# Patient Record
Sex: MALE | Employment: FULL TIME | URBAN - METROPOLITAN AREA
[De-identification: names, ages, dates, MRNs, and addresses within clinical notes are randomized per-mention and may not be internally consistent; named-entity substitution may affect disease eponyms.]

---

## 2023-12-06 ENCOUNTER — OFFICE VISIT (OUTPATIENT)
Dept: PRIMARY CARE | Facility: CLINIC | Age: 48
End: 2023-12-06
Payer: COMMERCIAL

## 2023-12-06 ENCOUNTER — LAB (OUTPATIENT)
Dept: LAB | Facility: LAB | Age: 48
End: 2023-12-06
Payer: COMMERCIAL

## 2023-12-06 VITALS
TEMPERATURE: 98.3 F | OXYGEN SATURATION: 97 % | HEIGHT: 70 IN | BODY MASS INDEX: 35.36 KG/M2 | DIASTOLIC BLOOD PRESSURE: 71 MMHG | WEIGHT: 247 LBS | HEART RATE: 61 BPM | SYSTOLIC BLOOD PRESSURE: 114 MMHG

## 2023-12-06 DIAGNOSIS — N50.812 PAIN IN LEFT TESTICLE: ICD-10-CM

## 2023-12-06 DIAGNOSIS — K59.00 CONSTIPATION, UNSPECIFIED CONSTIPATION TYPE: ICD-10-CM

## 2023-12-06 DIAGNOSIS — Z00.00 HEALTHCARE MAINTENANCE: Primary | ICD-10-CM

## 2023-12-06 DIAGNOSIS — D64.9 NORMOCYTIC NORMOCHROMIC ANEMIA: ICD-10-CM

## 2023-12-06 DIAGNOSIS — Z00.00 HEALTHCARE MAINTENANCE: ICD-10-CM

## 2023-12-06 LAB
ALBUMIN SERPL BCP-MCNC: 4.3 G/DL (ref 3.4–5)
ALP SERPL-CCNC: 55 U/L (ref 33–120)
ALT SERPL W P-5'-P-CCNC: 13 U/L (ref 10–52)
ANION GAP SERPL CALC-SCNC: 14 MMOL/L (ref 10–20)
APPEARANCE UR: CLEAR
AST SERPL W P-5'-P-CCNC: 17 U/L (ref 9–39)
BILIRUB SERPL-MCNC: 0.6 MG/DL (ref 0–1.2)
BILIRUB UR STRIP.AUTO-MCNC: NEGATIVE MG/DL
BUN SERPL-MCNC: 21 MG/DL (ref 6–23)
CALCIUM SERPL-MCNC: 9.3 MG/DL (ref 8.6–10.6)
CHLORIDE SERPL-SCNC: 107 MMOL/L (ref 98–107)
CHOLEST SERPL-MCNC: 175 MG/DL (ref 0–199)
CHOLESTEROL/HDL RATIO: 2.8
CO2 SERPL-SCNC: 25 MMOL/L (ref 21–32)
COLOR UR: YELLOW
CREAT SERPL-MCNC: 1.1 MG/DL (ref 0.5–1.3)
ERYTHROCYTE [DISTWIDTH] IN BLOOD BY AUTOMATED COUNT: 12.1 % (ref 11.5–14.5)
EST. AVERAGE GLUCOSE BLD GHB EST-MCNC: 94 MG/DL
GFR SERPL CREATININE-BSD FRML MDRD: 83 ML/MIN/1.73M*2
GLUCOSE SERPL-MCNC: 87 MG/DL (ref 74–99)
GLUCOSE UR STRIP.AUTO-MCNC: NEGATIVE MG/DL
HBA1C MFR BLD: 4.9 %
HCT VFR BLD AUTO: 38.5 % (ref 41–52)
HDLC SERPL-MCNC: 62.3 MG/DL
HGB BLD-MCNC: 12.4 G/DL (ref 13.5–17.5)
HIV 1+2 AB+HIV1 P24 AG SERPL QL IA: NONREACTIVE
KETONES UR STRIP.AUTO-MCNC: NEGATIVE MG/DL
LDLC SERPL CALC-MCNC: 100 MG/DL
LEUKOCYTE ESTERASE UR QL STRIP.AUTO: NEGATIVE
MCH RBC QN AUTO: 30.2 PG (ref 26–34)
MCHC RBC AUTO-ENTMCNC: 32.2 G/DL (ref 32–36)
MCV RBC AUTO: 94 FL (ref 80–100)
NITRITE UR QL STRIP.AUTO: NEGATIVE
NON HDL CHOLESTEROL: 113 MG/DL (ref 0–149)
NRBC BLD-RTO: 0 /100 WBCS (ref 0–0)
PH UR STRIP.AUTO: 6 [PH]
PLATELET # BLD AUTO: 251 X10*3/UL (ref 150–450)
POTASSIUM SERPL-SCNC: 4.5 MMOL/L (ref 3.5–5.3)
PROT SERPL-MCNC: 7.2 G/DL (ref 6.4–8.2)
PROT UR STRIP.AUTO-MCNC: NEGATIVE MG/DL
RBC # BLD AUTO: 4.11 X10*6/UL (ref 4.5–5.9)
RBC # UR STRIP.AUTO: NEGATIVE /UL
SODIUM SERPL-SCNC: 141 MMOL/L (ref 136–145)
SP GR UR STRIP.AUTO: 1.02
T PALLIDUM AB SER QL: NONREACTIVE
TRIGL SERPL-MCNC: 63 MG/DL (ref 0–149)
UROBILINOGEN UR STRIP.AUTO-MCNC: <2 MG/DL
VLDL: 13 MG/DL (ref 0–40)
WBC # BLD AUTO: 6 X10*3/UL (ref 4.4–11.3)

## 2023-12-06 PROCEDURE — 81003 URINALYSIS AUTO W/O SCOPE: CPT

## 2023-12-06 PROCEDURE — 84165 PROTEIN E-PHORESIS SERUM: CPT | Performed by: FAMILY MEDICINE

## 2023-12-06 PROCEDURE — 86481 TB AG RESPONSE T-CELL SUSP: CPT

## 2023-12-06 PROCEDURE — 87389 HIV-1 AG W/HIV-1&-2 AB AG IA: CPT

## 2023-12-06 PROCEDURE — 83540 ASSAY OF IRON: CPT

## 2023-12-06 PROCEDURE — 99204 OFFICE O/P NEW MOD 45 MIN: CPT

## 2023-12-06 PROCEDURE — 87800 DETECT AGNT MULT DNA DIREC: CPT

## 2023-12-06 PROCEDURE — 86780 TREPONEMA PALLIDUM: CPT

## 2023-12-06 PROCEDURE — 80053 COMPREHEN METABOLIC PANEL: CPT

## 2023-12-06 PROCEDURE — 80061 LIPID PANEL: CPT

## 2023-12-06 PROCEDURE — 83020 HEMOGLOBIN ELECTROPHORESIS: CPT | Performed by: FAMILY MEDICINE

## 2023-12-06 PROCEDURE — 84165 PROTEIN E-PHORESIS SERUM: CPT

## 2023-12-06 PROCEDURE — 36415 COLL VENOUS BLD VENIPUNCTURE: CPT

## 2023-12-06 PROCEDURE — 83036 HEMOGLOBIN GLYCOSYLATED A1C: CPT

## 2023-12-06 PROCEDURE — 99386 PREV VISIT NEW AGE 40-64: CPT

## 2023-12-06 PROCEDURE — 82607 VITAMIN B-12: CPT

## 2023-12-06 PROCEDURE — 85027 COMPLETE CBC AUTOMATED: CPT

## 2023-12-06 PROCEDURE — 87086 URINE CULTURE/COLONY COUNT: CPT

## 2023-12-06 PROCEDURE — 83021 HEMOGLOBIN CHROMOTOGRAPHY: CPT

## 2023-12-06 PROCEDURE — 83550 IRON BINDING TEST: CPT

## 2023-12-06 SDOH — ECONOMIC STABILITY: FOOD INSECURITY: WITHIN THE PAST 12 MONTHS, YOU WORRIED THAT YOUR FOOD WOULD RUN OUT BEFORE YOU GOT MONEY TO BUY MORE.: OFTEN TRUE

## 2023-12-06 SDOH — ECONOMIC STABILITY: FOOD INSECURITY: WITHIN THE PAST 12 MONTHS, THE FOOD YOU BOUGHT JUST DIDN'T LAST AND YOU DIDN'T HAVE MONEY TO GET MORE.: OFTEN TRUE

## 2023-12-06 ASSESSMENT — ENCOUNTER SYMPTOMS
CONSTITUTIONAL NEGATIVE: 1
ENDOCRINE NEGATIVE: 1
NEUROLOGICAL NEGATIVE: 1
RESPIRATORY NEGATIVE: 1
DIARRHEA: 0
VOMITING: 0
BLOOD IN STOOL: 0
EYES NEGATIVE: 1
OCCASIONAL FEELINGS OF UNSTEADINESS: 1
MYALGIAS: 1
DEPRESSION: 0
ALLERGIC/IMMUNOLOGIC NEGATIVE: 1
LOSS OF SENSATION IN FEET: 0
CONSTIPATION: 1
HEMATOLOGIC/LYMPHATIC NEGATIVE: 1
CARDIOVASCULAR NEGATIVE: 1
NAUSEA: 0
PSYCHIATRIC NEGATIVE: 1
JOINT SWELLING: 1
ABDOMINAL PAIN: 0

## 2023-12-06 ASSESSMENT — PATIENT HEALTH QUESTIONNAIRE - PHQ9
2. FEELING DOWN, DEPRESSED OR HOPELESS: NOT AT ALL
SUM OF ALL RESPONSES TO PHQ9 QUESTIONS 1 AND 2: 0
1. LITTLE INTEREST OR PLEASURE IN DOING THINGS: NOT AT ALL

## 2023-12-06 ASSESSMENT — PAIN SCALES - GENERAL: PAINLEVEL: 8

## 2023-12-06 NOTE — PROGRESS NOTES
Subjective   Patient ID: Janel Mar is a 48 y.o. male who presents for Establish Care ('bad feet').    Patient is here today to establish care and for a health maintenance visit. He reports last evaluation was years ago. Patient served long [employer] sentence (exact length unknown) and was released in April 2022. He currently rents a room in a shared house. He is working as a  and spends 8+ hours on his feet daily. Patient does note that he often runs out of food before he has enough money to pay for more. He additionally notes that he smokes cigars daily and has 1 glass of cognac nightly. Of note, has had a colonoscopy in the past. Is due for another and requesting a referral for this appointment today.     Food Insecurity: Food Insecurity Present (12/6/2023)      Hunger Vital Sign          Worried About Running Out of Food in the Last Year: Often true          Ran Out of Food in the Last Year: Often true    Patient has complaints of left testicle pain that started in August 2023. He reports episodes occur nightly and will last hours at a time. Pain is described as aching. Patient was last sexually active years ago. Denies any swelling, masses, or abnormal penile discharge. Denies anything that triggers these episodes. Has never taken or done anything that alleviates his pain.     He additionally notes worsening ankle swelling June/July 2023. He stands for long periods of time during the day, and notices pain in his feet and lateral tibias bilaterally. He has history of bunions and had a L bunionectomy in 2015 or 2016. He does have a podiatrist he sees regularly; next appointment is in January.     Lastly, patient reports constipation, has a  BM every 1-2 weeks. He notes associated bloating and intermittent abdominal discomfort. No other complaints today.            Review of Systems   Constitutional: Negative.    HENT: Negative.     Eyes: Negative.    Respiratory: Negative.     Cardiovascular: Negative.  "   Gastrointestinal:  Positive for constipation. Negative for abdominal pain, blood in stool, diarrhea, nausea and vomiting.   Endocrine: Negative.    Genitourinary:  Positive for testicular pain (left).   Musculoskeletal:  Positive for joint swelling (in feet) and myalgias.   Skin: Negative.    Allergic/Immunologic: Negative.    Neurological: Negative.    Hematological: Negative.    Psychiatric/Behavioral: Negative.         Objective   /71 (BP Location: Right arm, Patient Position: Sitting)   Pulse 61   Temp 36.8 °C (98.3 °F)   Ht 1.778 m (5' 10\")   Wt 112 kg (247 lb)   SpO2 97%   BMI 35.44 kg/m²     Physical Exam  Vitals reviewed.   Constitutional:       General: He is not in acute distress.     Appearance: Normal appearance. He is not ill-appearing.   HENT:      Head: Normocephalic and atraumatic.      Right Ear: External ear normal.      Left Ear: External ear normal.      Mouth/Throat:      Mouth: Mucous membranes are moist.      Pharynx: Oropharynx is clear.   Eyes:      General: No scleral icterus.     Extraocular Movements: Extraocular movements intact.      Pupils: Pupils are equal, round, and reactive to light.   Cardiovascular:      Rate and Rhythm: Normal rate and regular rhythm.      Pulses: Normal pulses.      Heart sounds: No murmur heard.  Pulmonary:      Effort: Pulmonary effort is normal. No respiratory distress.      Breath sounds: Normal breath sounds. No wheezing.   Abdominal:      General: Abdomen is flat. There is distension (mild).      Tenderness: There is no abdominal tenderness.      Hernia: There is no hernia in the left inguinal area or right inguinal area.   Genitourinary:     Penis: Normal and circumcised.       Testes:         Left: Tenderness: to palpation of epididymis.      Epididymis:      Right: Normal.      Left: Tenderness present.      Aamir stage (genital): 5.   Musculoskeletal:         General: Normal range of motion.      Comments: Bilateral feet with minimal " non-pitting edema. Patient has appropriate arches. Cannot put full weight on ball of left foot d/t pain.   Skin:     General: Skin is warm and dry.      Capillary Refill: Capillary refill takes less than 2 seconds.   Neurological:      General: No focal deficit present.      Mental Status: He is alert.   Psychiatric:         Mood and Affect: Mood normal.         Assessment/Plan   Problem List Items Addressed This Visit    None  Visit Diagnoses         Codes    Healthcare maintenance    -  Primary Z00.00    Relevant Orders    Lipid panel (Completed)    CBC (Completed)    Comprehensive metabolic panel (Completed)    HIV 1/2 Antigen/Antibody Screen with Reflex to Confirmation (Completed)    Colonoscopy Screening; Average Risk Patient    Referral to [facility]    T-Spot TB (Completed)    Hemoglobin A1c (Completed)    Pain in left testicle     N50.812    Relevant Orders    US scrotum    C. trachomatis / N. gonorrhoeae, DNA probe (Completed)    Urinalysis with Reflex Microscopic (Completed)    Urine Culture (Completed)    Syphilis Screen with Reflex (Completed)    Constipation, unspecified constipation type     K59.00          Mr. Mar is a 46 y/o male here today to establish care.    #left testicle pain  - recommend scrotal US for further evaluation  - STI testing since epididymis tender to palpation  - UA and UCx     #constipation  - recommend miralax    #HM  - blood work today (lipid panel, CBC, CMP, HIV, A1c)   - referral for colonoscopy  - TB testing (patient in high risk group as former inmate)    Patient seen and discussed with attending physician Francy Mclaughlin MD,  who agrees with the plan as described above.       Brenda Johnson,   Family Medicine, PGY-1

## 2023-12-07 DIAGNOSIS — D64.9 NORMOCYTIC NORMOCHROMIC ANEMIA: Primary | ICD-10-CM

## 2023-12-07 DIAGNOSIS — Z12.11 COLON CANCER SCREENING: ICD-10-CM

## 2023-12-07 LAB
BACTERIA UR CULT: NO GROWTH
C TRACH RRNA SPEC QL NAA+PROBE: NEGATIVE
IRON SATN MFR SERPL: 26 % (ref 25–45)
IRON SERPL-MCNC: 92 UG/DL (ref 35–150)
N GONORRHOEA DNA SPEC QL PROBE+SIG AMP: NEGATIVE
PROT SERPL-MCNC: 7.2 G/DL (ref 6.4–8.2)
TIBC SERPL-MCNC: 348 UG/DL (ref 240–445)
UIBC SERPL-MCNC: 256 UG/DL (ref 110–370)
VIT B12 SERPL-MCNC: 614 PG/ML (ref 211–911)

## 2023-12-07 RX ORDER — POLYETHYLENE GLYCOL 3350, SODIUM SULFATE ANHYDROUS, SODIUM BICARBONATE, SODIUM CHLORIDE, POTASSIUM CHLORIDE 236; 22.74; 6.74; 5.86; 2.97 G/4L; G/4L; G/4L; G/4L; G/4L
4000 POWDER, FOR SOLUTION ORAL ONCE
Qty: 4000 ML | Refills: 0 | Status: SHIPPED | OUTPATIENT
Start: 2023-12-07 | End: 2023-12-07

## 2023-12-07 NOTE — PROGRESS NOTES
NCNC anemia, mild, on CBC.  Add-on tests ordered for iron deficiency, B12 level, hemoglobin ID, and SPEP to further diagnose the cause.

## 2023-12-08 LAB
NIL(NEG) CONTROL SPOT COUNT: NORMAL
PANEL A SPOT COUNT: 0
PANEL B SPOT COUNT: 0
POS CONTROL SPOT COUNT: NORMAL
T-SPOT. TB INTERPRETATION: NEGATIVE

## 2023-12-09 LAB
ALBUMIN: 4.3 G/DL (ref 3.4–5)
ALPHA 1 GLOBULIN: 0.3 G/DL (ref 0.2–0.6)
ALPHA 2 GLOBULIN: 0.6 G/DL (ref 0.4–1.1)
BETA GLOBULIN: 0.8 G/DL (ref 0.5–1.2)
GAMMA GLOBULIN: 1.2 G/DL (ref 0.5–1.4)
PATH REVIEW-SERUM PROTEIN ELECTROPHORESIS: NORMAL
PROTEIN ELECTROPHORESIS COMMENT: NORMAL

## 2023-12-11 LAB
HEMOGLOBIN A2: 3.3 % (ref 2–3.5)
HEMOGLOBIN A: 96.4 % (ref 95.8–98)
HEMOGLOBIN F: 0.3 % (ref 0–2)
HEMOGLOBIN IDENTIFICATION INTERPRETATION: NORMAL
PATH REVIEW-HGB IDENTIFICATION: NORMAL

## 2023-12-13 ENCOUNTER — HOSPITAL ENCOUNTER (OUTPATIENT)
Dept: RADIOLOGY | Facility: HOSPITAL | Age: 48
Discharge: HOME | End: 2023-12-13
Payer: COMMERCIAL

## 2023-12-13 DIAGNOSIS — N50.812 PAIN IN LEFT TESTICLE: ICD-10-CM

## 2023-12-13 PROCEDURE — 76870 US EXAM SCROTUM: CPT

## 2023-12-14 DIAGNOSIS — N50.812 PAIN IN LEFT TESTICLE: Primary | ICD-10-CM

## 2023-12-14 NOTE — PROGRESS NOTES
Referred for evaluation of recurrent L scrotal pain.  Ultrasound showed left hydrocele with internal septations.  Area of epididymus was tender on exam.

## 2023-12-14 NOTE — PROGRESS NOTES
3/    Medicare Cap     [] Physical Therapy  [] Speech Therapy  [] Occupational therapy    *PT and Speech caps combine      $3586 Cap limit < kx modifier needed < $4029 requires pre-cert     Patient Name: Theresa Carreno  YOB: 1948     Date of [address] Name $$$ charge Daily Charge YTD   Total $   03/05/21 [place], man 90.90, 24.76 115.66 115.66   3/8/21 Ex x2      3/12/21 Ex x2 I saw and evaluated the patient. I personally obtained the key and critical portions of the history and physical exam or was physically present for key and critical portions performed by the resident/fellow. I reviewed the resident/fellow's documentation and discussed the patient with the resident/fellow. I agree with the resident/fellow's medical decision making as documented in the note with the exception/addition of the following:  STI tests I  believe were negative.  TB test neg.   Scrotal US showed normal testes.  There is a complex hydrocele with internal septations on the left.  Also small epididymal cysts and appendix epiploica(not swollen or torsed).   I am going to recommend a urology consult in view of his continued pain.  WE discussed a scrotal support at the time of his visit.     Francy Mclaughlin MD

## 2023-12-20 ENCOUNTER — APPOINTMENT (OUTPATIENT)
Dept: NUTRITION | Facility: HOSPITAL | Age: 48
End: 2023-12-20
Payer: COMMERCIAL

## 2023-12-26 ENCOUNTER — OFFICE VISIT (OUTPATIENT)
Dept: UROLOGY | Facility: CLINIC | Age: 48
End: 2023-12-26
Payer: COMMERCIAL

## 2023-12-26 VITALS — BODY MASS INDEX: 35.76 KG/M2 | TEMPERATURE: 97.3 F | WEIGHT: 249.8 LBS | HEIGHT: 70 IN

## 2023-12-26 DIAGNOSIS — N50.812 PAIN IN LEFT TESTICLE: ICD-10-CM

## 2023-12-26 PROCEDURE — 99203 OFFICE O/P NEW LOW 30 MIN: CPT | Performed by: UROLOGY

## 2023-12-26 NOTE — PROGRESS NOTES
"HPI:  48 y.o. yo male patient complains of  scrotal pain    #Scrotal pain   How long has this been going on- couple months  which side/ or both- left side  where is pain located- travels up to abdomen  any noticeable swelling- none  what was tried to relieve pain- has not taken anything to help  History of UTI/ STD exposure- none  History of vasectomy- no     No results found for: \"PSA\"  No components found for: \"CBC\"  Lab Results   Component Value Date    HGBA1C 4.9 12/06/2023       No results found for the last 90 days.        === 12/13/23 ===    US SCROTUM WITH DOPPLERS    - Impression -  1. Unremarkable appearance of the testes with normal Doppler flow.  2. Simple right-sided hydrocele with a complex left-sided hydrocele  with internal septations.  3. Epididymal head cysts bilaterally measuring up to 1.3 cm on the  right and 0.8 cm on the left.  4. Two pedunculated right epididymal appendages.    I personally reviewed the images/study, and I agree with the findings  as stated above. This study was interpreted at [place], [place].    MACRO:  None    Signed by: Jose James 12/13/2023 3:26 PM  Dictation workstation:   [id number]  PMH:  No past medical history on file.     PSH:  No past surgical history on file.     Medications:  No current outpatient medications on file.    Allergy:  No Known Allergies     Exam  Testicles descended bilaterally, nontender, no masses  Vasa palpable bilaterally  Varicocele: No  Penis circ'd, no lesions, no plaques      Assessment/Plan  #scrotal pain -   Discussed scrotal pain and its different etiologies, including epididymitis, cancer, orchitis, etc.  Discussed workup for epididymo-orchitis could include US, urine culture, and GC/CT  Discussed that even if he has a varicocele, there is no guarantee that this pain is from varicocele and that varicocele repair will alleviate the pain  Discussed cord block and microsurgical cord " denervation in detail, including risks benefits and alternatives  Discussed conservative measures like nsaids, elevation, ice, tight underwear etc.  Also discussed pelvic floor PT    Patient elects   UA/CX/GC/CT/ureaplasma/mycoplasma   Scrotal US  Pelvic floor PT    Follow up in *** months

## 2023-12-26 NOTE — PROGRESS NOTES
HPI:  48 y.o. male patient complains of  scrotal pain     #Scrotal pain   How long has this been going on- couple months  which side/ or both- left side  where is pain located- travels up to abdomen  any noticeable swelling- none  what was tried to relieve pain- has not taken anything to help  History of UTI/ STD exposure- none  History of vasectomy- no   UA - Negative 12/6/2023   Urine culture - Negative 12/6/2023           Lab Results   Component Value Date     HGBA1C 4.9 12/06/2023           === 12/13/23 ===     US SCROTUM WITH DOPPLERS     - Impression -  1. Unremarkable appearance of the testes with normal Doppler flow.  2. Simple right-sided hydrocele with a complex left-sided hydrocele  with internal septations.  3. Epididymal head cysts bilaterally measuring up to 1.3 cm on the  right and 0.8 cm on the left.  4. Two pedunculated right epididymal appendages.     I personally reviewed the images/study, and I agree with the findings  as stated above. This study was interpreted at [place], [place].     MACRO:  None     Signed by: Jose James 12/13/2023 3:26 PM  Dictation workstation:   [id number]  PMH:  Medical History   No past medical history on file.         PSH:  Surgical History   No past surgical history on file.         Medications:  No current outpatient medications on file.     Allergy:  No Known Allergies    Testicles descended bilaterally, nontender, no masses  Vasa palpable bilaterally  Penis circ'd, no lesions, no plaques  No major clinical hydrocele palpable    Exam  Testicles descended bilaterally, nontender, no masses  Vasa palpable bilaterally  Varicocele: No  Penis circ'd, no lesions, no plaques        Assessment/Plan  #scrotal pain - hydrocele  Discussed scrotal pain and its different etiologies, including epididymitis, cancer, orchitis, etc.  Discussed workup for epididymo-orchitis could include US, urine culture, and GC/CT  Discussed that  even if he has a varicocele, there is no guarantee that this pain is from varicocele and that varicocele repair will alleviate the pain  Discussed cord block and microsurgical cord denervation in detail, including risks benefits and alternatives  Discussed conservative measures like nsaids, elevation, ice, tight underwear etc.  Also discussed pelvic floor PT       Patient elects   Pelvic floor PT  -warning signs reviewed in detail     Follow up in 2 months      By signing my name below, IAnnamaria Scribe   attest that this documentation has been prepared under the direction and in the presence of Digna Perdomo MD

## 2024-01-31 ENCOUNTER — OFFICE VISIT (OUTPATIENT)
Dept: GASTROENTEROLOGY | Facility: EXTERNAL LOCATION | Age: 49
End: 2024-01-31
Payer: MEDICARE

## 2024-01-31 ENCOUNTER — LAB REQUISITION (OUTPATIENT)
Dept: LAB | Facility: HOSPITAL | Age: 49
End: 2024-01-31
Payer: COMMERCIAL

## 2024-01-31 DIAGNOSIS — K64.8 INTERNAL HEMORRHOIDS: Primary | ICD-10-CM

## 2024-01-31 DIAGNOSIS — D12.4 BENIGN NEOPLASM OF DESCENDING COLON: ICD-10-CM

## 2024-01-31 DIAGNOSIS — Z12.11 SPECIAL SCREENING FOR MALIGNANT NEOPLASMS, COLON: ICD-10-CM

## 2024-01-31 DIAGNOSIS — D12.5 BENIGN NEOPLASM OF SIGMOID COLON: ICD-10-CM

## 2024-01-31 DIAGNOSIS — Z00.00 HEALTHCARE MAINTENANCE: ICD-10-CM

## 2024-01-31 PROCEDURE — 88305 TISSUE EXAM BY PATHOLOGIST: CPT

## 2024-01-31 PROCEDURE — 0753T DGTZ GLS MCRSCP SLD LEVEL IV: CPT

## 2024-01-31 PROCEDURE — 88305 TISSUE EXAM BY PATHOLOGIST: CPT | Performed by: PATHOLOGY

## 2024-01-31 PROCEDURE — 45380 COLONOSCOPY AND BIOPSY: CPT | Performed by: INTERNAL MEDICINE

## 2024-02-07 LAB
LABORATORY COMMENT REPORT: NORMAL
PATH REPORT.FINAL DX SPEC: NORMAL
PATH REPORT.GROSS SPEC: NORMAL
PATH REPORT.RELEVANT HX SPEC: NORMAL
PATH REPORT.TOTAL CANCER: NORMAL

## 2024-03-01 ENCOUNTER — APPOINTMENT (OUTPATIENT)
Dept: PHYSICAL THERAPY | Facility: CLINIC | Age: 49
End: 2024-03-01
Payer: MEDICARE

## 2024-03-06 ENCOUNTER — APPOINTMENT (OUTPATIENT)
Dept: UROLOGY | Facility: CLINIC | Age: 49
End: 2024-03-06
Payer: MEDICARE

## 2024-04-05 ENCOUNTER — APPOINTMENT (OUTPATIENT)
Dept: NUTRITION | Facility: HOSPITAL | Age: 49
End: 2024-04-05
Payer: MEDICARE

## 2024-04-10 ENCOUNTER — OFFICE VISIT (OUTPATIENT)
Dept: PRIMARY CARE | Facility: CLINIC | Age: 49
End: 2024-04-10
Payer: MEDICARE

## 2024-04-10 VITALS
RESPIRATION RATE: 18 BRPM | OXYGEN SATURATION: 98 % | DIASTOLIC BLOOD PRESSURE: 73 MMHG | HEART RATE: 65 BPM | HEIGHT: 70 IN | TEMPERATURE: 97.6 F | BODY MASS INDEX: 35.92 KG/M2 | WEIGHT: 250.9 LBS | SYSTOLIC BLOOD PRESSURE: 104 MMHG

## 2024-04-10 DIAGNOSIS — Z59.82 TRANSPORTATION INSECURITY: ICD-10-CM

## 2024-04-10 DIAGNOSIS — K08.109 TEETH MISSING: Primary | ICD-10-CM

## 2024-04-10 PROCEDURE — 99213 OFFICE O/P EST LOW 20 MIN: CPT

## 2024-04-10 SDOH — ECONOMIC STABILITY - TRANSPORTATION SECURITY: TRANSPORTATION INSECURITY: Z59.82

## 2024-04-10 ASSESSMENT — PAIN SCALES - GENERAL: PAINLEVEL: 0-NO PAIN

## 2024-04-10 NOTE — PROGRESS NOTES
Subjective   Patient ID: Janel Mar is a 48 y.o. male who presents for Follow-up.     HPI   Janel Mar is a 48 year old man presenting for follow-up from his 12/6/23 visit. He chief complaint today is that he would like to be referred to mental health services.     #Mental health  Patient would like someone to talk to. He goes to [employer] and endorsed improvement in his mental status due to this activity. He is interested in joining a [employer] study group.     #Foot pain  #Ankle swelling  Patient has bilateral foot pain, primarily at the end of the day. He works 8+ hour shifts and is on his feet for most of them. He visited podiatry in January and has another visit tomorrow. He said that they typically shave off his bunions, but they grow back by the next visit. This intervention improves his pain. Patient also endorses bilateral ankle swelling. The swelling is worse at night. On the last visit, elevating his ankles and wearing compression socks was recommended. Patient endorses improvement in symptoms with elevation, but has not been able to buy compression socks because of the expense. He denies taking any over the counter pain medications (such as tylenol and ibuprofen), and is hesitant to start d/t concern for addiction.      #Food insecurity  Patient screened positive for food insecurity on 12/6/23. Patient feels like he has improved access to food since his last visit. He is sometimes concerned about running out of food towards the end of the month, but he endorses having enough to eat. He has been visiting the  food pantry.      #Weight loss  Patient is interested in learning more about methods for weight loss. Was previously scheduled with nutrition, but missed the appointment as he did not have reliable transportation. Next appointment scheduled on 4/29.     #Dentistry  Patient is interested in being referred to a dentist for possible implants.     #Left testicle pain  Patient has a known complex left  "hydrocele. He visited urology on 12/26 and they recommended pelvic floor PT. Patient had to switch insurance and hasn't had a change to start PT. He is still having testicular pain, but reports that it does not bother him very much.     #Constipation  Patient reports improvement in constipation symptoms due to Miralax usage. He previously had a bowel movement every 1-2 weeks and is now having bowel movements 1x/day.      Review of Systems   Constitutional:  Negative for chills, fatigue, fever and unexpected weight change.   HENT:  Negative for congestion, hearing loss, rhinorrhea and sore throat.    Eyes:  Negative for visual disturbance.   Respiratory:  Negative for cough, chest tightness, shortness of breath and wheezing.    Cardiovascular:  Negative for chest pain, palpitations and leg swelling.   Gastrointestinal:  Negative for abdominal pain, blood in stool, constipation, diarrhea, nausea and vomiting.   Endocrine: Negative for cold intolerance, heat intolerance, polydipsia and polyuria.   Genitourinary:  Positive for testicular pain. Negative for dysuria, frequency, hematuria and urgency.   Musculoskeletal:  Positive for joint swelling and ankle athralgia. Negative for myalgias.   Skin:  Negative for color change and rash.   Neurological:  Negative for dizziness, syncope, light-headedness, numbness and headaches.   Psychiatric/Behavioral:  Negative for dysphoric mood. The patient is not nervous/anxious.       Objective   /73 (BP Location: Left arm, Patient Position: Sitting, BP Cuff Size: Adult)   Pulse 65   Temp 36.4 °C (97.6 °F) (Temporal)   Resp 18   Ht 1.778 m (5' 10\")   Wt 114 kg (250 lb 14.4 oz)   SpO2 98%   BMI 36.00 kg/m²      Physical Exam  Constitutional:       Appearance: Normal appearance.   HENT:      Head: Normocephalic and atraumatic.      Nose: Nose normal. No congestion or rhinorrhea.      Mouth: Mucous membranes are moist. Missing multiple teeth.   Eyes:      General: No scleral " icterus.     Extraocular Movements: Extraocular movements intact.      Conjunctiva/sclera: Conjunctivae normal.   Cardiovascular:      Rate and Rhythm: Normal rate and regular rhythm.      Heart sounds: No murmur heard.     No friction rub. No gallop.   Pulmonary:      Effort: Pulmonary effort is normal.      Breath sounds: No wheezing, rhonchi or rales.   Abdominal:      General: Abdomen is flat. There is no distension.      Palpations: Abdomen is soft.      Tenderness: There is no abdominal tenderness.   Musculoskeletal:      Comments: Minimal bilateral ankle swelling present. No tenderness to palpation. Full passive and active ROM.    Skin:     General: Skin is warm and dry.   Neurological:      General: No focal deficit present.      Mental Status: He is alert.      Motor: No weakness.   Psychiatric:         Mood and Affect: Mood normal.         Behavior: Behavior normal.         Thought Content: Thought content normal.      Assessment/Plan   Janel Mar is a 48 year old man presenting for follow-up. See below for detailed discussion points:      #Mental health  - Referral to community mental health services, information provided  - will also refer to social work for help with transportation to appointments     #Ankle swelling  - Recommended discussing interventions at his appointment with podiatry tomorrow  - General supply request placed for compression socks  - Recommend OTC ibuprofen or tylenol for prn pain management. Patient educated regarding how medications work and how they are not addictive.      #Weight loss  - Nutrition appointment scheduled for 4/29     #Dentistry, interest in implants  -Referred to dentistry      Plan to follow-up in June 2024 to renew [facility] and for general follow-up.     Atiya Hazel, MS3    I saw and evaluated the patient with the medical student. I personally obtained the key and critical portions of the history and physical exam. I reviewed and modified the  medical student's documentation and discussed patient with them.     Patient staffed with Francy Mclaughlin MD.    Brenda Johnson DO  Family Medicine, PGY-1

## 2024-04-12 NOTE — PROGRESS NOTES
I reviewed the resident/fellow's documentation and discussed the patient with the resident/fellow. I agree with the resident/fellow's medical decision making as documented in the note.  Ankle swelling appears consistent with arthritis.  He has ongoing care with a podiatrist so we advised he discuss with podiatrist what he can do for improved comfort when standing/ walking.    Testicular pain is followed by urology consultant , c/w with scrotal cyst.  Francy Mclaughlin MD

## 2024-04-29 ENCOUNTER — CLINICAL SUPPORT (OUTPATIENT)
Dept: NUTRITION | Facility: HOSPITAL | Age: 49
End: 2024-04-29
Payer: MEDICARE

## 2024-04-29 NOTE — PROGRESS NOTES
[facility]  Diet Recommendation 1: Healthy Eating  Food Intolerance Avoidance: NKFA  Household Size: 1 Family Member  Interventions: Referral Number: 1st 6 Mo Referral 6 Mos  Interventions: Visit Number: 4 of 6 Visits - Max 6 Visits/Referral Each 6 Mo Period  Education Today: [employer]  Follow Up Notes for Future Visits: Looking for second job. Current job not giving enough hours to pay rent  Grains: 0-25% Whole  Fruit: 0-25% Fresh  Vegetables: 0-25% Fresh  Proteins: 0 Plant-based Items  Dairy: Lowfat - 100%  Originating Site of Referral Order: [facility] [place]  Initials of RD Assisting Today: LEIGH ANN

## 2024-05-21 ENCOUNTER — CLINICAL SUPPORT (OUTPATIENT)
Dept: NUTRITION | Facility: HOSPITAL | Age: 49
End: 2024-05-21
Payer: MEDICARE

## 2024-06-18 ENCOUNTER — CLINICAL SUPPORT (OUTPATIENT)
Dept: NUTRITION | Facility: HOSPITAL | Age: 49
End: 2024-06-18
Payer: MEDICARE

## 2024-06-18 NOTE — PROGRESS NOTES
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 Lives in a boarding home and main refrigerator was removed. Has a mini fridge in room, but storage is limited. Asking for new referral at end of month MD appt.  Grains: 50-75% Whole  Fruit: 0-25% Fresh  Proteins: 0 Plant-based Items  Dairy: Lowfat - 100%  Originating Site of Referral Order: [facility] [place]  Initials of RD Assisting Today: LEIGH ANN

## 2024-06-27 ENCOUNTER — APPOINTMENT (OUTPATIENT)
Dept: PRIMARY CARE | Facility: CLINIC | Age: 49
End: 2024-06-27
Payer: MEDICARE

## 2024-07-19 ENCOUNTER — APPOINTMENT (OUTPATIENT)
Dept: NUTRITION | Facility: HOSPITAL | Age: 49
End: 2024-07-19
Payer: MEDICARE

## 2024-07-30 ENCOUNTER — APPOINTMENT (OUTPATIENT)
Dept: PRIMARY CARE | Facility: CLINIC | Age: 49
End: 2024-07-30
Payer: MEDICARE

## 2024-07-30 VITALS
BODY MASS INDEX: 35.93 KG/M2 | OXYGEN SATURATION: 96 % | HEART RATE: 75 BPM | DIASTOLIC BLOOD PRESSURE: 79 MMHG | HEIGHT: 70 IN | WEIGHT: 251 LBS | TEMPERATURE: 97.5 F | SYSTOLIC BLOOD PRESSURE: 136 MMHG

## 2024-07-30 DIAGNOSIS — Z59.41 FOOD INSECURITY: ICD-10-CM

## 2024-07-30 DIAGNOSIS — R60.9 DEPENDENT EDEMA: Primary | ICD-10-CM

## 2024-07-30 DIAGNOSIS — N43.3 HYDROCELE IN ADULT: ICD-10-CM

## 2024-07-30 DIAGNOSIS — Z78.9 HISTORY OF INCARCERATION: ICD-10-CM

## 2024-07-30 PROCEDURE — 3008F BODY MASS INDEX DOCD: CPT

## 2024-07-30 PROCEDURE — 99214 OFFICE O/P EST MOD 30 MIN: CPT

## 2024-07-30 SDOH — ECONOMIC STABILITY - FOOD INSECURITY: FOOD INSECURITY: Z59.41

## 2024-07-30 ASSESSMENT — ENCOUNTER SYMPTOMS
OCCASIONAL FEELINGS OF UNSTEADINESS: 0
LOSS OF SENSATION IN FEET: 0
DEPRESSION: 0

## 2024-07-30 ASSESSMENT — PAIN SCALES - GENERAL: PAINLEVEL: 0-NO PAIN

## 2024-07-30 NOTE — PROGRESS NOTES
Patient ID: Janel Mar is a 48 y.o. male who presents for Follow-up.    Subjective    HPI  Janel Mar is a 48 year old man presenting for follow-up from his 04/10/24 visit. His chief complaint today is ankle swelling and R ankle pain associated with standing at his work.     #Foot pain  #Ankle swelling   -Patient works 5 days a week 4-5 hours a day now. On feet for entire duration of shift. 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 Most recent podiatrist visit was 2024, has f/u in 2024. They will re-shave his bunions. Patient was recommended compression socks, elevation at night without significant change in swelling. Endorses bilateral ankle swelling, but only pain of the right ankle: soreness when being active/walking. Denies calf tenderness or swelling of legs. He denies any OTC pain medication usage, patient hesitant to take medications.     #Mental Health:   Patient talks to family and friends for mental health support. Patient says reading the Bible and Alevism practices help his mental health as well. Follows with online . Patient states he utilized some community mental health services her was provided at previous visit, but states he did not think they were for him. He reports stable mood and feels well. Patient notes that he was previously incarcerated for 4 years and has been out for 2 years.      #Left testicle pain  Patient hx of complex left hydrocele: visited urology 2023, recommended PFPT. Insurance change prevented patient from being able to start PT. Pt reports pain feels better now than 04/10/2024. No pain today, but rates a squeezing pain at 4-6/10, typically at night 1-2 times weekly lasting ~10-15 minutes. Does not bother patient much at this time.    #Food insecurity:   Patient's  Food for Life  in , hoping to have renewed. Patient says he has canned goods at home, and has sufficient food at this time.     #Dentistry:   Patient  "was unable to schedule with dentist for possible implants, states he needs a referral.     #Class II Obesity   Patient did not want to follow with nutritionist because he states he eats a balanced diet of veggies and meats. He is not concerned about his weight today    #Health Maintenance:   Patient reports smoking 2 Black & Milds daily    Review of Systems  No fevers, chills, weight is stable  No sores, ulcers, rashes, skin lesions  No HA, SZ, syncope, stroke, TIA,   No angina, chest pressure, palpitations  No cough, SOB, hemoptysis  No ABD pain   No BRBPR, melena, hematochezia  No dysuria, polyuria, hematuria  No bleeding or bruising  No edema, no calf pain    No current outpatient medications    Objective   Vitals: /79 (BP Location: Right arm, Patient Position: Sitting)   Pulse 75   Temp 36.4 °C (97.5 °F) (Temporal)   Ht 1.778 m (5' 10\")   Wt 114 kg (251 lb)   SpO2 96%   BMI 36.01 kg/m²    Physical Exam  Constitutional:       General: He is not in acute distress.     Appearance: Normal appearance. He is not ill-appearing.   HENT:      Head: Normocephalic and atraumatic.      Nose: Nose normal.      Mouth/Throat:      Mouth: Mucous membranes are moist.   Eyes:      Pupils: Pupils are equal, round, and reactive to light.   Cardiovascular:      Rate and Rhythm: Normal rate and regular rhythm.      Pulses: Normal pulses.      Heart sounds: Normal heart sounds. No murmur heard.     No friction rub. No gallop.   Pulmonary:      Effort: Pulmonary effort is normal.      Breath sounds: Normal breath sounds. No wheezing.   Abdominal:      General: Abdomen is flat. Bowel sounds are normal. There is no distension.      Palpations: Abdomen is soft. There is no mass.   Musculoskeletal:      Cervical back: Normal range of motion.   Skin:     General: Skin is warm and dry.      Capillary Refill: Capillary refill takes less than 2 seconds.      Coloration: Skin is not jaundiced.   Neurological:      General: No focal " deficit present.      Mental Status: He is alert and oriented to person, place, and time. Mental status is at baseline.   Psychiatric:         Mood and Affect: Mood normal.         Behavior: Behavior normal.         Assessment/Plan   ASSESSMENT:  Janel Mar is a 48 year old man presenting for follow-up from his 04/10/24 visit. He has minimal non-pitting, likely dependent edema of the B/L ankles likely secondary to his long work shifts, standing, and activity during work. His mood today is stable, and he reports no mental health concerns at this time.     PLAN:  #Dependent edema of ankles  -Patient has podiatry follow up in 08/22/2024.   -Patient will continue wearing compression socks and elevating legs at night  -Declined note to workplace regarding recommended breaks/rest  -Counseled that smoking can lead to PAD associated with symptoms of lower extremity pain    #Mental Health:   -Mood stable, patient reports adequate support with friends/family/online . Will RTC if concerns.    #Left testicle pain 2/2 hydrocele  :: US doppler ( 12/2023): unremarkable appearance of testis. Simple right sided hydrocele with complex left-sided hydrocole with internal septations.   -Pain improved since last visit, is not concerning to patient. Will RTC if significant changes or concerns develop.    #Food insecurity:   -Renew Patient's  Food for Life access    #Dentistry:   -Referral to dentistry    #Class II Obesity   -Patient pre-contemplative on dietary changes, counseled on healthy eating and balanced diet. Does not desire further consultation with nutritionist at this time.    #Health Maintenance:   -Pre-contemplative on smoking cessation.  -Order Quantiferon TB Gold given history of incarceration  -Blood pressure elevated in office at 136/79. Ordered home blood pressure monitor with follow up in 3 months.    RTC in 3 months, or earlier as needed.      Kendrick Ferreira  MS3, [facility]    Seen and discussed with     Aiden  MD Nathalie, MPH   Family Medicine and Preventive Health, PGY-3    I saw and evaluated the patient with the medical student. I personally obtained the key and critical portions of the history and physical exam. I reviewed and modified the student's documentation and discussed patient with the medical student. I agree with the above documentation and medical decision making.

## 2024-07-30 NOTE — PROGRESS NOTES
Patient ID: Janel Mar is a 48 y.o. male who presents for Follow-up.    Subjective    HPI  Janel Mar is a 48 year old man presenting for follow-up from his 04/10/24 visit. His chief complaint today is ankle swelling and R ankle pain associated with standing at his work.     #Foot pain  #Ankle swelling   -Patient works 5 days a week 4-5 hours a day now. On feet for entire duration of shift. 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 Most recent podiatrist visit was 2024, has f/u in 2024. They will re-shave his bunions. Patient was recommended compression socks, elevation at night without significant change in swelling. Endorses bilateral ankle swelling, but only pain of the right ankle: soreness when being active/walking. Denies calf tenderness or swelling of legs. He denies any OTC pain medication usage, patient hesitant to take medications.     #Mental Health:   Patient talks to family and friends for mental health support. Patient says reading the Bible and Holiness practices help his mental health as well. Follows with online . Patient states he utilized some community mental health services her was provided at previous visit, but states he did not think they were for him. He reports stable mood and feels well. Patient notes that he was previously incarcerated for 4 years and has been out for 2 years.      #Left testicle pain  Patient hx of complex left hydrocele: visited urology 2023, recommended PFPT. Insurance change prevented patient from being able to start PT. Pt reports pain feels better now than 04/10/2024. No pain today, but rates a squeezing pain at 4-6/10, typically at night 1-2 times weekly lasting ~10-15 minutes. Does not bother patient much at this time.    #Food insecurity:   Patient's  [employer] for [employer]  in , hoping to have renewed. Patient says he has canned goods at home, and has sufficient food at this time.     #Dentistry:   Patient  "was unable to schedule with dentist for possible implants, states he needs a referral.     #Class II Obesity   Patient did not want to follow with nutritionist because he states he eats a balanced diet of veggies and meats. He is not concerned about his weight today    #Health Maintenance:   Patient reports smoking 2 Black & Milds daily    Review of Systems  No fevers, chills, weight is stable  No sores, ulcers, rashes, skin lesions  No HA, SZ, syncope, stroke, TIA,   No angina, chest pressure, palpitations  No cough, SOB, hemoptysis  No ABD pain   No BRBPR, melena, hematochezia  No dysuria, polyuria, hematuria  No bleeding or bruising  No edema, no calf pain    No current outpatient medications    Objective   Vitals: /79 (BP Location: Right arm, Patient Position: Sitting)   Pulse 75   Temp 36.4 °C (97.5 °F) (Temporal)   Ht 1.778 m (5' 10\")   Wt 114 kg (251 lb)   SpO2 96%   BMI 36.01 kg/m²    Physical Exam  Constitutional:       General: He is not in acute distress.     Appearance: Normal appearance. He is not ill-appearing.   HENT:      Head: Normocephalic and atraumatic.      Nose: Nose normal.      Mouth/Throat:      Mouth: Mucous membranes are moist.   Eyes:      Pupils: Pupils are equal, round, and reactive to light.   Cardiovascular:      Rate and Rhythm: Normal rate and regular rhythm.      Pulses: Normal pulses.      Heart sounds: Normal heart sounds. No murmur heard.     No friction rub. No gallop.   Pulmonary:      Effort: Pulmonary effort is normal.      Breath sounds: Normal breath sounds. No wheezing.   Abdominal:      General: Abdomen is flat. Bowel sounds are normal. There is no distension.      Palpations: Abdomen is soft. There is no mass.   Musculoskeletal:      Cervical back: Normal range of motion.   Skin:     General: Skin is warm and dry.      Capillary Refill: Capillary refill takes less than 2 seconds.      Coloration: Skin is not jaundiced.   Neurological:      General: No focal " deficit present.      Mental Status: He is alert and oriented to person, place, and time. Mental status is at baseline.   Psychiatric:         Mood and Affect: Mood normal.         Behavior: Behavior normal.         Assessment/Plan   ASSESSMENT:  Janel Mar is a 48 year old man presenting for follow-up from his 04/10/24 visit. He has minimal non-pitting, likely dependent edema of the B/L ankles likely secondary to his long work shifts, standing, and activity during work. His mood today is stable, and he reports no mental health concerns at this time.     PLAN:  #Dependent edema of ankles  -Patient has podiatry follow up in 08/22/2024.   -Patient will continue wearing compression socks and elevating legs at night  -Declined note to workplace regarding recommended breaks/rest  -Counseled that smoking can lead to PAD associated with symptoms of lower extremity pain    #Mental Health:   -Mood stable, patient reports adequate support with friends/family/online . Will RTC if concerns.    #Left testicle pain 2/2 hydrocele  :: US doppler ( 12/2023): unremarkable appearance of testis. Simple right sided hydrocele with complex left-sided hydrocole with internal septations.   -Pain improved since last visit, is not concerning to patient. Will RTC if significant changes or concerns develop.    #Food insecurity:   -Renew Patient's  Food for [employer] access    #Dentistry:   -Referral to dentistry    #Class II Obesity   -Patient pre-contemplative on dietary changes, counseled on healthy eating and balanced diet. Does not desire further consultation with nutritionist at this time.    #Health Maintenance:   -Pre-contemplative on smoking cessation.  -Order Quantiferon TB Gold given history of incarceration  -Blood pressure elevated in office at 136/79. Ordered home blood pressure monitor with follow up in 3 months.    RTC in 3 months, or earlier as needed.      Kendrick Ferreira  MS3, [facility] [place]    Seen and discussed with     Aiden  MD Nathalie, MPH   Family Medicine and Preventive Health, PGY-3    I saw and evaluated the patient with the medical student. I personally obtained the key and critical portions of the history and physical exam. I reviewed and modified the student's documentation and discussed patient with the medical student. I agree with the above documentation and medical decision making.

## 2024-07-30 NOTE — PROGRESS NOTES
Attestation: I discussed the patient with the attending and resident, and reviewed the  documentation. I agree with the residents medical decision making and plans as documented in the note.       Basim Holder MD  FM & PM Resident

## 2024-08-04 NOTE — PROGRESS NOTES
I saw and evaluated the patient. I personally obtained the key and critical portions of the history and physical exam or was physically present for key and critical portions performed by the resident/fellow. I reviewed the resident/fellow's documentation and discussed the patient with the resident/fellow. I agree with the resident/fellow's medical decision making as documented in the note.  Continue to encourage healthy diet and exercise to improve BP and BMI.  Patient will monitor BP at home and bring readings to f/u appointment.  Continue to encourage smoking cessation. Consider referral to Urology and PFPT if hydroceles again problematic.    Sheryl Zabala MD

## 2024-09-09 ENCOUNTER — CLINICAL SUPPORT (OUTPATIENT)
Dept: NUTRITION | Facility: HOSPITAL | Age: 49
End: 2024-09-09
Payer: COMMERCIAL

## 2024-09-09 DIAGNOSIS — Z59.41 FOOD INSECURITY: ICD-10-CM

## 2024-09-09 SDOH — ECONOMIC STABILITY - FOOD INSECURITY: FOOD INSECURITY: Z59.41

## 2024-09-09 NOTE — PROGRESS NOTES
Food For Life  Diet Recommendation 1: Healthy Eating  Food Intolerance Avoidance: NKFA  Household Size: 1 Family Member  Interventions: Referral Number: 2nd 6 Mo Referral 1 yr (Referrals may not be consecutive)  Interventions: Visit Number: 1 of 6 Visits - Max 6 Visits/Referral Each 6 Mo Period  Education Today: MyPlate Meals  Follow Up Notes for Future Visits: Pt stated that he has no particular health goals at this time.  Fruit: 50-75% Fresh  Proteins: 1-2 Plant-based Items  Originating Site of Referral Order: [facility]  Initials of RD Assisting Today: CHERYL

## 2024-10-03 ENCOUNTER — APPOINTMENT (OUTPATIENT)
Dept: PRIMARY CARE | Facility: CLINIC | Age: 49
End: 2024-10-03
Payer: COMMERCIAL

## 2024-10-07 ENCOUNTER — CLINICAL SUPPORT (OUTPATIENT)
Dept: NUTRITION | Facility: HOSPITAL | Age: 49
End: 2024-10-07
Payer: COMMERCIAL

## 2024-10-07 NOTE — PROGRESS NOTES
Food For Life  Diet Recommendation 1: Healthy Eating  Food Intolerance Avoidance: NKFA  Household Size: 1 Family Member  Interventions: Referral Number: 2nd 6 Mo Referral 1 yr (Referrals may not be consecutive)  Interventions: Visit Number: 2 of 6 Visits - Max 6 Visits/Referral Each 6 Mo Period  Education Today: MyPlate Meals  Follow Up Notes for Future Visits: Discussed plate method for balanced meals today. Loves sarshimons.  Grains: 50-75% Whole  Fruit: 50-75% Fresh  Vegetables: 25-50% Fresh  Proteins: 0 Plant-based Items  Dairy: 0-25% Lowfat  Originating Site of Referral Order: [facility]  Initials of RD Assisting Today: CHERYL

## 2024-10-21 ENCOUNTER — APPOINTMENT (OUTPATIENT)
Dept: PRIMARY CARE | Facility: CLINIC | Age: 49
End: 2024-10-21
Payer: COMMERCIAL

## 2024-10-21 ENCOUNTER — LAB (OUTPATIENT)
Dept: LAB | Facility: LAB | Age: 49
End: 2024-10-21
Payer: COMMERCIAL

## 2024-10-21 ENCOUNTER — HOSPITAL ENCOUNTER (OUTPATIENT)
Dept: RADIOLOGY | Facility: HOSPITAL | Age: 49
Discharge: HOME | End: 2024-10-21
Payer: COMMERCIAL

## 2024-10-21 VITALS
SYSTOLIC BLOOD PRESSURE: 102 MMHG | HEART RATE: 104 BPM | HEIGHT: 70 IN | DIASTOLIC BLOOD PRESSURE: 68 MMHG | OXYGEN SATURATION: 98 % | BODY MASS INDEX: 35.88 KG/M2 | WEIGHT: 250.6 LBS

## 2024-10-21 DIAGNOSIS — G89.29 CHRONIC PAIN OF BOTH ANKLES: ICD-10-CM

## 2024-10-21 DIAGNOSIS — M25.572 CHRONIC PAIN OF BOTH ANKLES: Primary | ICD-10-CM

## 2024-10-21 DIAGNOSIS — M79.89 LOCALIZED SWELLING OF LOWER EXTREMITY: ICD-10-CM

## 2024-10-21 DIAGNOSIS — M25.571 CHRONIC PAIN OF BOTH ANKLES: Primary | ICD-10-CM

## 2024-10-21 DIAGNOSIS — Z78.9 HISTORY OF INCARCERATION: ICD-10-CM

## 2024-10-21 DIAGNOSIS — M25.571 CHRONIC PAIN OF BOTH ANKLES: ICD-10-CM

## 2024-10-21 DIAGNOSIS — G89.29 CHRONIC PAIN OF BOTH ANKLES: Primary | ICD-10-CM

## 2024-10-21 DIAGNOSIS — M25.572 CHRONIC PAIN OF BOTH ANKLES: ICD-10-CM

## 2024-10-21 LAB — TSH SERPL-ACNC: 1.11 MIU/L (ref 0.44–3.98)

## 2024-10-21 PROCEDURE — 3008F BODY MASS INDEX DOCD: CPT

## 2024-10-21 PROCEDURE — 73610 X-RAY EXAM OF ANKLE: CPT | Mod: RT

## 2024-10-21 PROCEDURE — 84443 ASSAY THYROID STIM HORMONE: CPT

## 2024-10-21 PROCEDURE — 86481 TB AG RESPONSE T-CELL SUSP: CPT

## 2024-10-21 PROCEDURE — 73610 X-RAY EXAM OF ANKLE: CPT | Mod: LT

## 2024-10-21 PROCEDURE — 99406 BEHAV CHNG SMOKING 3-10 MIN: CPT

## 2024-10-21 PROCEDURE — 73610 X-RAY EXAM OF ANKLE: CPT | Mod: LEFT SIDE | Performed by: RADIOLOGY

## 2024-10-21 PROCEDURE — 36415 COLL VENOUS BLD VENIPUNCTURE: CPT

## 2024-10-21 PROCEDURE — 99213 OFFICE O/P EST LOW 20 MIN: CPT

## 2024-10-21 PROCEDURE — 73610 X-RAY EXAM OF ANKLE: CPT | Mod: RIGHT SIDE | Performed by: RADIOLOGY

## 2024-10-21 RX ORDER — DICLOFENAC SODIUM 10 MG/G
4 GEL TOPICAL 4 TIMES DAILY
Qty: 100 G | Refills: 1 | Status: SHIPPED | OUTPATIENT
Start: 2024-10-21

## 2024-10-21 ASSESSMENT — ENCOUNTER SYMPTOMS
DEPRESSION: 0
OCCASIONAL FEELINGS OF UNSTEADINESS: 1
LOSS OF SENSATION IN FEET: 0

## 2024-10-21 ASSESSMENT — COLUMBIA-SUICIDE SEVERITY RATING SCALE - C-SSRS
2. HAVE YOU ACTUALLY HAD ANY THOUGHTS OF KILLING YOURSELF?: NO
1. IN THE PAST MONTH, HAVE YOU WISHED YOU WERE DEAD OR WISHED YOU COULD GO TO SLEEP AND NOT WAKE UP?: NO
6. HAVE YOU EVER DONE ANYTHING, STARTED TO DO ANYTHING, OR PREPARED TO DO ANYTHING TO END YOUR LIFE?: NO

## 2024-10-21 ASSESSMENT — PAIN SCALES - GENERAL: PAINLEVEL_OUTOF10: 0-NO PAIN

## 2024-10-21 NOTE — PROGRESS NOTES
Patient ID: Janel Mar is a 48 y.o. male who presents for Follow-up.    Subjective    HPI  Janel Mar is a 48 year old man presenting for follow-up from his 04/10/24 visit. His chief complaint today is ankle swelling and R ankle pain associated with standing at his work.     #Foot pain  #Ankle swelling   -Patient works 5 days a week 4-5 hours a day now. On feet for entire duration of shift. 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 Most recent podiatrist visit 08/22/2024. Has multiple corns and caluses that he gets shaved down periodically. Patient was recommended compression socks, elevation at night without significant change in swelling. Endorses bilateral ankle swelling, but only pain of the right ankle: soreness when being active/walking. Denies calf tenderness or swelling of legs. He denies any OTC pain medication usage, patient hesitant to take medications.     #Left testicle pain  Patient hx of complex left hydrocele: visited urology 12/26/2023, recommended PFPT. Insurance change prevented patient from being able to start PT. Pt reports pain has continued to improve.     #Dentistry:   Following with dentistry, scheduled to have multiple teeth pulled on 11/4/24.    #Class II Obesity   Patient did not want to follow with nutritionist because he states he eats a balanced diet of veggies and meats. He is not concerned about his weight today    #Health Maintenance:   Patient reports smoking 1 Black & Milds daily, decreased from 2. He states he struggles the most to abstain from his morning black and mild, but overall has decreased his usage. Working on eliminating his smoking all together.    Review of Systems  No fevers, chills, weight is stable  No sores, ulcers, rashes, skin lesions  No HA, SZ, syncope, stroke, TIA,   No angina, chest pressure, palpitations  No cough, SOB, hemoptysis  No ABD pain   No BRBPR, melena, hematochezia  No dysuria, polyuria, hematuria  No bleeding or  "bruising  No edema, no calf pain    No current outpatient medications    Objective   Vitals: /68 (BP Location: Left arm, Patient Position: Sitting, BP Cuff Size: Adult)   Pulse 104   Ht 1.778 m (5' 10\")   Wt 114 kg (250 lb 9.6 oz)   SpO2 98%   BMI 35.96 kg/m²      Physical Exam  Constitutional:       General: He is not in acute distress.     Appearance: Normal appearance. He is not ill-appearing.   HENT:      Head: Normocephalic and atraumatic.      Nose: Nose normal.      Mouth/Throat:      Mouth: Mucous membranes are moist.      Comments: Multiple missing teeth and cavities  Eyes:      Pupils: Pupils are equal, round, and reactive to light.   Cardiovascular:      Rate and Rhythm: Normal rate and regular rhythm.      Pulses: Normal pulses.      Heart sounds: Normal heart sounds. No murmur heard.     No friction rub. No gallop.   Pulmonary:      Effort: Pulmonary effort is normal.      Breath sounds: Normal breath sounds. No wheezing.   Abdominal:      General: There is no distension.      Palpations: Abdomen is soft.      Tenderness: There is no abdominal tenderness.   Musculoskeletal:      Cervical back: Normal range of motion.      Comments: Minimal non-pitting edema of bilateral lower extremities.    Skin:     General: Skin is warm and dry.      Capillary Refill: Capillary refill takes less than 2 seconds.      Coloration: Skin is not jaundiced.      Comments: Corn/callus on left ventral foot surface. No wounds, purulence, or erythema of lower extremities.    Neurological:      General: No focal deficit present.      Mental Status: He is alert and oriented to person, place, and time. Mental status is at baseline.   Psychiatric:         Mood and Affect: Mood normal.         Behavior: Behavior normal.       Assessment/Plan   ASSESSMENT:  Janel Mar is a 48 year old man presenting for follow-up from his 04/10/24 visit. He has minimal non-pitting, likely dependent edema of the B/L ankles likely " secondary to his long work shifts, standing, and activity during work. His mood today is stable, and he reports no mental health concerns at this time.     PLAN:  #Dependent edema of ankles  -continue following with podiatry  -Patient will continue wearing compression socks and elevating legs at night  -Declined note to workplace regarding recommended breaks/rest  -will obtain films of ankles to evaluate for possible stress fracture or OA.  -No SOB, orthopnea, or other symptoms concerning for heart failure, but will check BNP to rule out. TSH also ordered  - topical voltaren gel ordered for pain of feet/lower extremities     #Mental Health:   -Mood stable, patient reports adequate support with current community    #Left testicle pain 2/2 hydrocele  :: US doppler ( 12/2023): unremarkable appearance of testis. Simple right sided hydrocele with complex left-sided hydrocole with internal septations.   -Pain improved since last visit, is not concerning to patient at this time.     #Dentistry:   -follow up as scheduled for teeth extraction on 11/4    #Health Maintenance:   -contemplative on smoking cessation. Working on decreasing amount of black and milds per day. Declining nicotine patches at this abida.   -Order Quantiferon TB Gold given history of incarceration    RTC in 3 months for annual physical exam, or earlier as needed.      Patient discussed with attending physician, MD Brenda Hand,   Family Medicine, R2

## 2024-11-11 ENCOUNTER — CLINICAL SUPPORT (OUTPATIENT)
Dept: NUTRITION | Facility: HOSPITAL | Age: 49
End: 2024-11-11
Payer: MEDICARE

## 2024-11-11 ENCOUNTER — PHARMACY VISIT (OUTPATIENT)
Dept: PHARMACY | Facility: CLINIC | Age: 49
End: 2024-11-11
Payer: COMMERCIAL

## 2024-11-11 PROCEDURE — RXMED WILLOW AMBULATORY MEDICATION CHARGE

## 2024-11-11 NOTE — PROGRESS NOTES
Food For Life  Diet Recommendation 1: Healthy Eating  Diet Recommendation 2: Soft (Chewing Difficulty)  Food Intolerance Avoidance: NKFA  Household Size: 1 Family Member  Interventions: Referral Number: 2nd 6 Mo Referral 1 yr (Referrals may not be consecutive)  Interventions: Visit Number: 3 of 6 Visits - Max 6 Visits/Referral Each 6 Mo Period  Education Today: MyPlate Meals  Follow Up Notes for Future Visits: Patient has recently had teeth removed. Has dentures but they are uncomfortable. Reviewed foods in the market that he can make soft enough to eat without teeth.  Grains: 0-25% Whole  Fruit: 0-25% Fresh  Vegetables: 50-75% Fresh  Proteins: 0 Plant-based Items  Dairy: 50-75% Lowfat  Originating Site of Referral Order: [facility]  Initials of RD Assisting Today:

## 2024-12-16 ENCOUNTER — CLINICAL SUPPORT (OUTPATIENT)
Dept: NUTRITION | Facility: HOSPITAL | Age: 49
End: 2024-12-16
Payer: MEDICARE

## 2024-12-16 NOTE — PROGRESS NOTES
Food For Life  Diet Recommendation 1: Healthy Eating  Diet Recommendation 2: Soft (Chewing Difficulty)  Food Intolerance Avoidance: NKFA  Household Size: 1 Family Member  Interventions: Referral Number: 2nd 6 Mo Referral 1 yr (Referrals may not be consecutive)  Interventions: Visit Number: 4 of 6 Visits - Max 6 Visits/Referral Each 6 Mo Period  Education Today: Healthy Eating on a Budget  Follow Up Notes for Future Visits: Discussed easy to chew foods. Suggested get adhesive for dentures.  Grains: 0-25% Whole  Fruit: 25-50% Fresh  Vegetables: 50-75% Fresh  Proteins: 0 Plant-based Items  Dairy: 25-50% Lowfat  Originating Site of Referral Order: [facility]  Initials of RD Assisting Today: CHERYL

## 2025-01-23 ENCOUNTER — APPOINTMENT (OUTPATIENT)
Dept: PRIMARY CARE | Facility: CLINIC | Age: 50
End: 2025-01-23
Payer: COMMERCIAL

## 2025-01-23 VITALS
TEMPERATURE: 97.6 F | HEART RATE: 69 BPM | BODY MASS INDEX: 34.83 KG/M2 | WEIGHT: 243.3 LBS | SYSTOLIC BLOOD PRESSURE: 114 MMHG | OXYGEN SATURATION: 97 % | HEIGHT: 70 IN | DIASTOLIC BLOOD PRESSURE: 74 MMHG

## 2025-01-23 DIAGNOSIS — Z72.0 TOBACCO CONSUMPTION: ICD-10-CM

## 2025-01-23 DIAGNOSIS — R20.2 PARESTHESIA OF LEFT ARM: Primary | ICD-10-CM

## 2025-01-23 PROCEDURE — 3008F BODY MASS INDEX DOCD: CPT

## 2025-01-23 PROCEDURE — 99214 OFFICE O/P EST MOD 30 MIN: CPT

## 2025-01-23 PROCEDURE — 99214 OFFICE O/P EST MOD 30 MIN: CPT | Mod: GC

## 2025-01-23 ASSESSMENT — ENCOUNTER SYMPTOMS: DEPRESSION: 0

## 2025-01-23 ASSESSMENT — PATIENT HEALTH QUESTIONNAIRE - PHQ9
2. FEELING DOWN, DEPRESSED OR HOPELESS: NOT AT ALL
1. LITTLE INTEREST OR PLEASURE IN DOING THINGS: NOT AT ALL
SUM OF ALL RESPONSES TO PHQ9 QUESTIONS 1 AND 2: 0

## 2025-01-23 ASSESSMENT — PAIN SCALES - GENERAL: PAINLEVEL_OUTOF10: 3

## 2025-01-23 NOTE — PROGRESS NOTES
"Patient ID: Janel Mar is a 49 y.o. male who presents for Follow-up and Hand Pain.    Subjective    Janel Mar is a 49 year old man presenting for follow-up and with new complaint of intermittent left arm pain.     Patient reports that in November 2024, he started to experience episodes of left arm pain with associated stiffness. Pain is described as sharp, intense and with associated burning and numbness that radiates distally down the left arm. Will be unable to move left arm during these episodes. Episodes last 5-6 minutes at a time and occur 2-3 times daily. 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 Patient is right-handed. Denies any trauma, injury, or other inciting event prior to onset of symptoms. Denies personal or family hx of seizures, LOC, cough,     Does have a history of smoking, >20 year pack hx.       Current Outpatient Medications   Medication Instructions    diclofenac sodium (VOLTAREN) 4 g, Topical, 4 times daily       Objective   Vitals: /74 (BP Location: Right arm, Patient Position: Sitting)   Pulse 69   Temp 36.4 °C (97.6 °F) (Temporal)   Ht 1.778 m (5' 10\")   Wt 110 kg (243 lb 4.8 oz)   SpO2 97%   BMI 34.91 kg/m²      Physical Exam  Constitutional:       General: He is not in acute distress.     Appearance: Normal appearance. He is not ill-appearing.   HENT:      Head: Normocephalic and atraumatic.   Cardiovascular:      Rate and Rhythm: Normal rate and regular rhythm.      Pulses: Normal pulses.      Heart sounds: Normal heart sounds. No murmur heard.     No friction rub. No gallop.   Pulmonary:      Effort: Pulmonary effort is normal.      Breath sounds: Normal breath sounds. No wheezing.   Abdominal:      General: There is no distension.      Palpations: Abdomen is soft.      Tenderness: There is " no abdominal tenderness.   Musculoskeletal:         General: Normal range of motion.      Cervical back: Normal range of motion. No rigidity.      Comments: Negative Adson's and Spurling's on left. UE strength 5/5 bilaterally in all muscle groups.    Skin:     General: Skin is warm and dry.      Capillary Refill: Capillary refill takes less than 2 seconds.      Comments: Hyperpigmented macules on bilateral palms.    Neurological:      General: No focal deficit present.      Mental Status: He is alert and oriented to person, place, and time. Mental status is at baseline.   Psychiatric:         Mood and Affect: Mood normal.         Behavior: Behavior normal.         Assessment/Plan   ASSESSMENT/PLAN:  Janel Mar is a 49 year old man presenting for follow-up and with complaint of new left arm pain/stiffness. Episodes are not apparently related to any trigger, last 5-6 min and occur 2-3 times daily. Differential includes focal seizure vs cervical radiculopathy vs thoracic outlet syndrome (Pancoast tumor) vs syphilis vs vasculitis.     Unlikely to be syphilis, as patient had negative RPR in 2023 and has not been sexually active in 7+ years. However, due to presence of maculopapular hand rash and abnormal arm pain, will recheck RPR today.   Also less likely to be vasculitis, as patient has no other systemic symptoms indicating inflammation. If preliminary work up negative, or if patient develops systemic symptoms/worsening rash, will consider ordering CRP/ESR.     Symptoms more likely related to possible cervical radiculopathy, thoracic outlet syndrome, or new onset focal seizures. Unable to reproduce symptoms in-office on exam. Will obtain MRI head for evaluation of possible new onset focal seizure activity. To rule out cervical radiculopathy 2/2 disc herniation or stenosis, will order PT. If no changes in symptoms or worsening of symptoms with PT, will obtain MRI cervical spine. Lastly, to r/o development of lung  mass, will obtain chest xray at this time.     RTC in 1 month for follow-up regarding arm pain.      Patient seen and discussed with attending physician, MD Brenda Salinas,   Family Medicine, R2

## 2025-01-27 ENCOUNTER — CLINICAL SUPPORT (OUTPATIENT)
Dept: NUTRITION | Facility: HOSPITAL | Age: 50
End: 2025-01-27
Payer: MEDICARE

## 2025-01-27 NOTE — PROGRESS NOTES
[employer]  Diet Recommendation 1: Healthy Eating  Diet Recommendation 2: Soft (Chewing Difficulty)  Food Intolerance Avoidance: NKFA  Household Size: 1 Family Member  Interventions: Referral Number: 2nd 6 Mo Referral 1 yr (Referrals may not be consecutive)  Interventions: Visit Number: 5 of 6 Visits - Max 6 Visits/Referral Each 6 Mo Period  Education Today: [employer] Meals  Follow Up Notes for Future Visits: Discussed easy to chew foods. Suggested get adhesive for dentures.  Grains: 0-25% Whole  Fruit: 0-25% Fresh  Vegetables: 25-50% Fresh  Proteins: 0 Plant-based Items  Dairy: 50-75% Lowfat  Originating Site of Referral Order: [facility]  Initials of RD Assisting Today: MB

## 2025-01-28 ENCOUNTER — TELEPHONE (OUTPATIENT)
Facility: HOSPITAL | Age: 50
End: 2025-01-28
Payer: MEDICARE

## 2025-01-28 NOTE — TELEPHONE ENCOUNTER
Copied from CRM #[id number]. Topic: Information Request - Get Appointment Information  >> Jan 28, 2025  9:42 AM Mykel NEWELL wrote:  Called on behalf of the pt in regards to orders cavanaugh CT/MRI has been denied. Please contact to discuss.  [place] (with ) [phone]    Thank you

## 2025-01-30 NOTE — TELEPHONE ENCOUNTER
Completed peer to peer regarding head and neck CTs that were ordered to evaluate patient. Both CTs cancelled. Brain MRI ordered for evaluation of possible new onset focal seizures. PT ordered to address possible cervical radiculopathy.     [id number] - call reference number.    Attempted to call patient to inform him of order changes, unable to reach.

## 2025-01-31 NOTE — PROGRESS NOTES
I saw and evaluated the patient. I personally obtained the key and critical portions of the history and physical exam or was physically present for key and critical portions performed by the resident/fellow. I reviewed the resident/fellow's documentation and discussed the patient with the resident/fellow. I agree with the resident/fellow's medical decision making as documented in the note.  He has not yet obtained a chest xray but was advised that it is important, given possibility of thoracic outlet syndrome causing the arm symptoms.     Francy Mclaughlin MD

## 2025-02-03 ENCOUNTER — APPOINTMENT (OUTPATIENT)
Dept: RADIOLOGY | Facility: HOSPITAL | Age: 50
End: 2025-02-03
Payer: MEDICARE

## 2025-02-05 ENCOUNTER — TELEPHONE (OUTPATIENT)
Facility: HOSPITAL | Age: 50
End: 2025-02-05
Payer: MEDICARE

## 2025-02-05 NOTE — TELEPHONE ENCOUNTER
Provider requested this nurse to reach out top pt to inform of changes to plan of care. Instead of doing an CT, a head MRI has been ordered and physical therapy d/t pt insurance will not cover CT without PT first. Call placed, no answer. Voicemail left to return nurse call to refill line, so that she may return pt call when available.

## 2025-02-09 ENCOUNTER — APPOINTMENT (OUTPATIENT)
Dept: RADIOLOGY | Facility: HOSPITAL | Age: 50
End: 2025-02-09
Payer: MEDICARE

## 2025-02-17 ENCOUNTER — APPOINTMENT (OUTPATIENT)
Dept: RADIOLOGY | Facility: HOSPITAL | Age: 50
End: 2025-02-17

## 2025-03-02 ENCOUNTER — APPOINTMENT (OUTPATIENT)
Dept: RADIOLOGY | Facility: HOSPITAL | Age: 50
End: 2025-03-02
Payer: MEDICARE

## 2025-03-10 ENCOUNTER — CLINICAL SUPPORT (OUTPATIENT)
Facility: HOSPITAL | Age: 50
End: 2025-03-10
Payer: MEDICARE

## 2025-03-10 NOTE — PROGRESS NOTES
[employer]  Diet Recommendation 1: Healthy Eating  Diet Recommendation 2: Soft (Chewing Difficulty)  Food Intolerance Avoidance: NKFA  Household Size: 1 Family Member  Interventions: Referral Number: 2nd 6 Mo Referral 1 yr (Referrals may not be consecutive)  Interventions: Visit Number: 6 of 6 Visits - Max 6 Visits/Referral Each 6 Mo Period  Grains: 0-25% Whole  Fruit: 0-25% Fresh  Vegetables: 25-50% Fresh  Proteins: 1-2 Plant-based Items  Dairy: Lowfat - 100%  Originating Site of Referral Order: [facility]  Initials of RD Assisting Today: SB

## 2025-03-17 ENCOUNTER — APPOINTMENT (OUTPATIENT)
Dept: RADIOLOGY | Facility: HOSPITAL | Age: 50
End: 2025-03-17
Payer: MEDICARE

## 2025-03-20 ENCOUNTER — APPOINTMENT (OUTPATIENT)
Dept: RADIOLOGY | Facility: HOSPITAL | Age: 50
End: 2025-03-20
Payer: MEDICARE

## 2025-04-07 ENCOUNTER — APPOINTMENT (OUTPATIENT)
Dept: RADIOLOGY | Facility: HOSPITAL | Age: 50
End: 2025-04-07
Payer: MEDICARE

## 2025-04-14 ENCOUNTER — APPOINTMENT (OUTPATIENT)
Dept: RADIOLOGY | Facility: HOSPITAL | Age: 50
End: 2025-04-14
Payer: COMMERCIAL

## 2025-04-14 ENCOUNTER — APPOINTMENT (OUTPATIENT)
Facility: HOSPITAL | Age: 50
End: 2025-04-14
Payer: COMMERCIAL

## 2025-04-21 ENCOUNTER — HOSPITAL ENCOUNTER (OUTPATIENT)
Dept: RADIOLOGY | Facility: HOSPITAL | Age: 50
Discharge: HOME | End: 2025-04-21
Payer: COMMERCIAL

## 2025-04-21 DIAGNOSIS — R20.2 PARESTHESIA OF LEFT ARM: ICD-10-CM

## 2025-04-21 PROCEDURE — 70551 MRI BRAIN STEM W/O DYE: CPT | Performed by: RADIOLOGY

## 2025-04-21 PROCEDURE — 70551 MRI BRAIN STEM W/O DYE: CPT

## 2025-04-28 ENCOUNTER — APPOINTMENT (OUTPATIENT)
Dept: RADIOLOGY | Facility: HOSPITAL | Age: 50
End: 2025-04-28
Payer: COMMERCIAL

## 2025-05-05 ENCOUNTER — CLINICAL SUPPORT (OUTPATIENT)
Facility: HOSPITAL | Age: 50
End: 2025-05-05
Payer: COMMERCIAL

## 2025-05-05 NOTE — PROGRESS NOTES
[employer]  Diet Recommendation 1: Healthy Eating  Diet Recommendation 2: Soft (Chewing Difficulty)  Food Intolerance Avoidance: NKFA  Household Size: 1 Family Member  Interventions: Referral Number: 3rd 6 Mo Referral 1.5 yrs (Referrals may not be consecutive)  Interventions: Visit Number: 1 of 6 Visits - Max 6 Visits/Referral Each 6 Mo Period  Grains: 0-25% Whole  Fruit: 25-50% Fresh  Vegetables: 25-50% Fresh  Proteins: 1-2 Plant-based Items  Dairy: 25-50% Lowfat  Originating Site of Referral Order: [facility]  Initials of RD Assisting Today: SB

## 2025-05-12 ENCOUNTER — OFFICE VISIT (OUTPATIENT)
Facility: HOSPITAL | Age: 50
End: 2025-05-12
Payer: COMMERCIAL

## 2025-05-12 VITALS
SYSTOLIC BLOOD PRESSURE: 127 MMHG | BODY MASS INDEX: 34.7 KG/M2 | DIASTOLIC BLOOD PRESSURE: 82 MMHG | TEMPERATURE: 96.2 F | WEIGHT: 242.4 LBS | OXYGEN SATURATION: 98 % | HEART RATE: 57 BPM | HEIGHT: 70 IN

## 2025-05-12 DIAGNOSIS — R20.2 PARESTHESIA OF LEFT ARM: Primary | ICD-10-CM

## 2025-05-12 PROCEDURE — 3008F BODY MASS INDEX DOCD: CPT

## 2025-05-12 PROCEDURE — 99213 OFFICE O/P EST LOW 20 MIN: CPT | Mod: GE

## 2025-05-12 PROCEDURE — 99213 OFFICE O/P EST LOW 20 MIN: CPT

## 2025-05-12 ASSESSMENT — PAIN SCALES - GENERAL: PAINLEVEL_OUTOF10: 0-NO PAIN

## 2025-05-12 NOTE — PROGRESS NOTES
"Patient ID: Janel Mar is a 49 y.o. male who presents for Follow-up.    Subjective    Janel Mar is a 49 year old man presenting for follow-up regarding intermittent left arm pain.     Patient started to experience episodes of left arm pain with associated stiffness in November 2024. Continues to have these episodes intermittently; states they are stable, not occurring more frequently, and not causing him to be unable to work. Denies any new complaints today.    Current Outpatient Medications   Medication Instructions    diclofenac sodium (VOLTAREN) 4 g, Topical, 4 times daily       Objective   Vitals: /82 (BP Location: Right arm, Patient Position: Sitting, BP Cuff Size: Adult)   Pulse 57   Temp 35.7 °C (96.2 °F) (Temporal)   Ht 1.778 m (5' 10\")   Wt 110 kg (242 lb 6.4 oz)   SpO2 98%   BMI 34.78 kg/m²      Physical Exam  Constitutional:       General: He is not in acute distress.     Appearance: Normal appearance. He is not ill-appearing.   HENT:      Head: Normocephalic and atraumatic.   Cardiovascular:      Rate and Rhythm: Normal rate and regular rhythm.      Heart sounds: Normal heart sounds.   Pulmonary:      Effort: Pulmonary effort is normal.      Breath sounds: Normal breath sounds.   Musculoskeletal:         General: Normal range of motion.      Cervical back: Normal range of motion. No rigidity.   Neurological:      Mental Status: He is alert.   Psychiatric:         Mood and Affect: Mood normal.       === 04/21/25 ===    MR BRAIN WO CONTRAST    - Impression -  Unremarkable MRI of the brain.    This study was interpreted at [facility].    MACRO:  None    Signed by: Álvaro Roa 4/21/2025 2:40 PM  Dictation workstation:   [id number]          Assessment/Plan   Janel Mar is a 49 year old man presenting for follow-up. MRI head to evaluate for possible brain lesion was normal. Patient unable to get CT, as \"insurance won't cover it\". Problem is stable " at this time. Patient has not initiated PT. If problem gets worse, will recommend definitely starting PT and obtaining CT neck for evaluation of nerve compression.     RTC in 4-6 months for annual physical, sooner if needed.      Patient discussed with attending physician, MD Brenda Vivas, DO  Family Medicine, R2

## 2025-05-19 ENCOUNTER — APPOINTMENT (OUTPATIENT)
Facility: HOSPITAL | Age: 50
End: 2025-05-19
Payer: COMMERCIAL

## 2025-06-09 ENCOUNTER — APPOINTMENT (OUTPATIENT)
Facility: HOSPITAL | Age: 50
End: 2025-06-09
Payer: COMMERCIAL

## 2025-07-14 ENCOUNTER — APPOINTMENT (OUTPATIENT)
Facility: HOSPITAL | Age: 50
End: 2025-07-14
Payer: COMMERCIAL

## 2025-07-14 NOTE — PROGRESS NOTES
[employer]  Diet Recommendation 1: Healthy Eating  Diet Recommendation 2: Soft (Chewing Difficulty)  Diet Recommendation 3: MyPlate  Food Intolerance Avoidance: NKFA  Household Size: 1 Family Member  Interventions: Referral Number: 3rd 6 Mo Referral 1.5 yrs (Referrals may not be consecutive)  Interventions: Visit Number: 2 of 6 Visits - Max 6 Visits/Referral Each 6 Mo Period  Grains: 50-75% Whole  Fruit: 25-50% Fresh  Vegetables: Fresh - 100%  Proteins: 0 Plant-based Items  Dairy: 25-50% Lowfat  Relevant [employer] Inpatient Discharge Items: Messaged provider for new referral  Originating Site of Referral Order: Dr. Sheryl Zabala  Initials of RD Assisting Today: ONOFRE

## 2025-07-21 DIAGNOSIS — Z59.41 FOOD INSECURITY: Primary | ICD-10-CM

## 2025-07-21 SDOH — ECONOMIC STABILITY - FOOD INSECURITY: FOOD INSECURITY: Z59.41

## 2025-08-18 ENCOUNTER — APPOINTMENT (OUTPATIENT)
Facility: HOSPITAL | Age: 50
End: 2025-08-18
Payer: COMMERCIAL

## 2025-09-22 ENCOUNTER — APPOINTMENT (OUTPATIENT)
Facility: HOSPITAL | Age: 50
End: 2025-09-22
Payer: COMMERCIAL